# Patient Record
Sex: FEMALE | Race: WHITE
[De-identification: names, ages, dates, MRNs, and addresses within clinical notes are randomized per-mention and may not be internally consistent; named-entity substitution may affect disease eponyms.]

---

## 2017-08-07 ENCOUNTER — HOSPITAL ENCOUNTER (OUTPATIENT)
Dept: HOSPITAL 45 - C.MAMM | Age: 42
Discharge: HOME | End: 2017-08-07
Attending: OBSTETRICS & GYNECOLOGY
Payer: COMMERCIAL

## 2017-08-07 DIAGNOSIS — Z12.31: Primary | ICD-10-CM

## 2017-08-07 NOTE — MAMMOGRAPHY REPORT
BILATERAL DIGITAL SCREENING MAMMOGRAM TOMOSYNTHESIS WITH CAD: 8/7/2017

CLINICAL HISTORY: Routine screening.  Patient has no complaints.  





TECHNIQUE:  Breast tomosynthesis in addition to standard 2D mammography was performed. Current study 
was also evaluated with a Computer Aided Detection (CAD) system.  



COMPARISON: Comparison is made to exams dated:  8/3/2016 mammogram, 12/9/2015 mammogram, 12/9/2015 ul
trasound, 7/7/2015 mammogram - [facility], 12/12/2013 mammogram, and 12/3/2013 mamm
ogram - [facility].   



BREAST COMPOSITION:  There are scattered areas of fibroglandular density in both breasts.  



FINDINGS: There are stable metallic biopsy markers within each breast.  Fluctuating circumscribed rou
nd and oval masses bilaterally most likely representing fluctuating cysts.  No suspicious spiculated 
or irregular mass, architectural distortion or cluster of new, suspicious microcalcifications is seen
.  



IMPRESSION:  ACR BI-RADS CATEGORY 1: NEGATIVE

There is no mammographic evidence of malignancy. A 1 year screening mammogram is recommended.  The pa
tient will receive written notification of the results.  





Approximately 10% of breast cancers are not detected with mammography. A negative mammographic report
 should not delay biopsy if a clinically suggestive mass is present.



Paola Rdz M.D.          

ay/:8/7/2017 15:25:32  



Imaging Technologist: Meena Figueroa, [facility]

letter sent: Normal 1/2  

BI-RADS Code: ACR BI-RADS Category 1: Negative

## 2017-08-30 ENCOUNTER — HOSPITAL ENCOUNTER (EMERGENCY)
Dept: HOSPITAL 45 - C.EDB | Age: 42
Discharge: HOME | End: 2017-08-30
Payer: COMMERCIAL

## 2017-08-30 VITALS
WEIGHT: 177.47 LBS | BODY MASS INDEX: 29.57 KG/M2 | HEIGHT: 65 IN | WEIGHT: 177.47 LBS | HEIGHT: 65 IN | BODY MASS INDEX: 29.57 KG/M2

## 2017-08-30 VITALS
SYSTOLIC BLOOD PRESSURE: 147 MMHG | OXYGEN SATURATION: 95 % | TEMPERATURE: 98.42 F | DIASTOLIC BLOOD PRESSURE: 78 MMHG | HEART RATE: 81 BPM

## 2017-08-30 DIAGNOSIS — Z79.3: ICD-10-CM
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
## 2017-08-30 DIAGNOSIS — S61.011A: Primary | ICD-10-CM
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
## 2017-08-30 DIAGNOSIS — Z77.21: ICD-10-CM

## 2017-08-30 DIAGNOSIS — Z82.49: ICD-10-CM

## 2017-08-30 DIAGNOSIS — Z83.3: ICD-10-CM

## 2017-08-30 NOTE — EMERGENCY ROOM VISIT NOTE
History


First contact with patient:  13:44


Chief Complaint:  LACERATION/CUT (SUT/DERMABOND)


Stated Complaint:  NEEDLE STICK





History of Present Illness


The patient is a 41 year old female who presents to the Emergency Room from the 

operating room with complaints of "needle stick".  The patient states that 

earlier today, approximately 1:20 PM, she was in the operating room when she 

was being handed a handsaw, they have been contaminated with blood from the 

source patient.  She states that it cut through her glove, and into her right 

thumb.  She states she cleaned it out very well, and closed this with 

Dermabond.  She is right-handed.  She notes minimal pain.  She denies any 

numbness or tingling distal currently.  She denies any movement difficulties.  

Her tetanus is up-to-date.  She is unsure of the source patient's name.  The 

attending is Dr. Esquivel.





Review of Systems


A complete 6-point Review of Systems was discussed with the patient, with 

pertinent positives and negatives listed in the History of Present Illness. All 

remaining Review of Systems questions can be considered negative unless 

otherwise specified.





Past Medical/Surgical History








Family History


 Diabetes, heart disease, high blood pressure.





Social History


Smoking Status:  Never Smoker


Patient currently lives locally with spouse and 2 kids.





Current/Historical Medications


Scheduled


Birth Control Pills (Birth Control Pills), 1 TAB PO DAILY





Physical Exam


Vital Signs











  Date Time  Temp Pulse Resp B/P (MAP) Pulse Ox O2 Delivery O2 Flow Rate FiO2


 


17 15:09 36.9 81 16 147/78 95   


 


17 15:08  81 16 147/78 95 Room Air  


 


17 13:41 36.9 83 16 136/84 93 Room Air  











Physical Exam


VITAL SIGNS - Vital signs and nursing notes were reviewed.  Afebrile, 

normotensive, non-tachycardic and is saturating well on room air 93%.


GENERAL -41-year-old female appearing her stated age who is in no acute 

distress. Communicates well with provider and answers questions appropriately.


SKIN - Without rashes.  No petechial rashes.  There is evidence of a break in 

the integument at the base of the left first digit, that has been closed with 

Dermabond.


HEAD - NC/AT.


EXTREMITIES - full range of motion of the left thumb.  Capillary refill intact.

  She is neurovascularly intact in the extremity.





Medical Decision & Procedures


Medical Decision


Patient was seen and evaluated as above.  She presents to us today status post 

significant exposure in the operating room.  At this time I do believe that 

drawing baseline labs appropriate.  I discussed this with Juan M Galdamez, 

[employer] [facility].  She recommends having the patient notified a source patient, 

and notifying her.  Patient was able to successfully do so, and also informed 

the attending to have the HIV consent forms completed.  Patient declined 

prophylaxis against HIV.  At this time I do believe that the wound is already 

appropriately cleansed and closed.  She is to return if worsening.  The 

appropriate paperwork was completed.  She was educated upon worrisome symptoms 

which to return, had questions or discharge, and was discharged home in good 

condition.





In evaluation treatment this patient following differential diagnoses were 

entertained: Significant blood-borne exposure, need for prophylaxis against HIV

, among others.





Impression





 Primary Impression:  


 Laceration


 Additional Impression:  


 Employee exposure to blood





Departure Information


Dispostion


Home / Self-Care





Condition


GOOD





Referrals


Eloy Chavez M.D. (PCP)





Patient Instructions


My [facility]





Additional Instructions





You were seen in the emergency Department for a needlestick accident/sharp 

object accident in the operating room.





At this time you have had baseline labs drawn.





Please identify the source patient, and please call the number below.  The 

extension is listed.  Please also notify the attending physician to complete 

the consent for the patient for HIV testing.





[id number] juan m Saenz





Please watch for signs of infection to include redness, swelling, drainage from 

the wound and if these are to develop please return immediately.





Please return the emergency department with any new/concerning symptoms.





Thank you for your time.





Problem Qualifiers

## 2018-08-08 ENCOUNTER — HOSPITAL ENCOUNTER (OUTPATIENT)
Dept: HOSPITAL 45 - C.MAMM | Age: 43
Discharge: HOME | End: 2018-08-08
Attending: OBSTETRICS & GYNECOLOGY
Payer: COMMERCIAL

## 2018-08-08 DIAGNOSIS — Z12.31: Primary | ICD-10-CM

## 2018-08-09 NOTE — MAMMOGRAPHY REPORT
BILATERAL DIGITAL SCREENING MAMMOGRAM TOMOSYNTHESIS WITH CAD: 8/8/2018

CLINICAL HISTORY: Routine screening.  





TECHNIQUE: The study was acquired using full field digital technology and interpreted from soft copy.
 Breast tomosynthesis in addition to standard 2D mammography was performed. Current study was also ev
aluated with a Computer Aided Detection (CAD) system.  



COMPARISON: Comparison is made to exams dated:  8/7/2017 mammogram, 8/3/2016 mammogram, 12/9/2015 [facility]
mogram, and 7/7/2015 mammogram - [facility].   

BREAST COMPOSITION: There are scattered areas of fibroglandular density in both breasts.  



FINDINGS: There are fluctuating circumscribed masses in the left upper outer quadrant, most likely re
presenting fluctuating cysts.  A ribbon-shaped biopsy marker clip is seen within a lobulated circumsc
ribed mass in the right upper outer quadrant, representing a biopsy-proven fibroadenoma.  There is a 
stable emanuel-shaped biopsy clip in the lateral left breast. No new suspicious mass, architectural disto
rtion or cluster of microcalcifications is seen.  



IMPRESSION: ACR BI-RADS CATEGORY 1: NEGATIVE

There is no mammographic evidence of malignancy. A 1 year screening mammogram is recommended.(08/09/2
019)  The patient will receive written notification of the results.  





Some breast cancers are not detected with mammography. A negative mammographic report should not alfred
y biopsy if a clinically suggestive mass is present.



Paola Rdz M.D.          

ay/:8/8/2018 15:53:53  



Imaging Technologist: Meena Velazco RT(R)(M), [facility]

letter sent: Normal 1/2  

BI-RADS Code: ACR BI-RADS Category 1: Negative